# Patient Record
Sex: FEMALE | Race: WHITE
[De-identification: names, ages, dates, MRNs, and addresses within clinical notes are randomized per-mention and may not be internally consistent; named-entity substitution may affect disease eponyms.]

---

## 2019-04-09 ENCOUNTER — HOSPITAL ENCOUNTER (OUTPATIENT)
Dept: HOSPITAL 75 - RAD | Age: 59
End: 2019-04-09
Attending: PEDIATRICS
Payer: COMMERCIAL

## 2019-04-09 DIAGNOSIS — G95.89: ICD-10-CM

## 2019-04-09 DIAGNOSIS — M50.31: Primary | ICD-10-CM

## 2019-04-09 DIAGNOSIS — M46.83: ICD-10-CM

## 2019-04-09 DIAGNOSIS — M50.21: ICD-10-CM

## 2019-04-09 DIAGNOSIS — Z98.1: ICD-10-CM

## 2019-04-09 DIAGNOSIS — M48.03: ICD-10-CM

## 2019-04-09 PROCEDURE — 72141 MRI NECK SPINE W/O DYE: CPT

## 2019-04-09 NOTE — DIAGNOSTIC IMAGING REPORT
CLINICAL INDICATION: Patient with neck pain and bilateral arm

pain/numbness. Patient had cervical spine surgery about four

years ago.



EXAM: MRI of the cervical spine performed without IV contrast.

Sequences include sagittal T2, sagittal T1, sagittal STIR, axial

T2, and axial T1.



COMPARISON: None.



FINDINGS: Limited visualization posterior fossa shows no

significant abnormality. There is an area of focal cord

compression and amorphous localized intramedullary cord signal

seen at the level. Otherwise, the remainder of the cervical cord

is unremarkable.



There are postop changes to the cervical spine with C4 through C7

anterior cervical disc fusion. There is no paraspinal fluid

collection.



C1-C2: There is no significant central canal narrowing.



C2-C3: There is moderate left facet arthropathy and mild right

facet arthropathy. There is ligamentum flavum buckling. There is

mild central canal narrowing. There is moderate-to-severe left

neural foramen narrowing and no significant right neural foramen

narrowing.



C3-C4: There is a diffuse disc bulge with moderate loss of

vertebral disc height. Hypertrophic posterior disc spurs and

bilateral uncinate spurs. There is moderate right facet

arthropathy/hypertrophy and mild left facet arthropathy. There is

severe central narrowing with cervical cord impingement and

deformity. There is severe bilateral neural foramen narrowing.



C4-C5: There is mild bilateral facet arthropathy. There is no

significant central spinal canal or neural foramen narrowing.



C5 C6: There is localized ligament flavum buckling which causes

mild mucosal upon the thecal sac posteriorly. There is

mild-to-moderate bilateral facet arthropathy. There is at least

moderate right neural foramen narrowing and mild left neural

foramen narrowing. There is mild-to-moderate central canal

narrowing.



C6-C7: There is mild bilateral facet arthropathy. There is no

significant central canal narrowing. There is at least mild left

neural foramen narrowing.



C7-T1: There is moderate bilateral facet arthropathy. There is at

least mild bilateral neural foramen narrowing. There is no

significant central canal narrowing.



IMPRESSION:

1: There is C3-C4 adjacent level severe degenerative disc disease

with diffuse disc bulge, hypertrophic disc spurs and facet

arthropathy. There is associated severe central canal narrowing

and severe bilateral neural foramen narrowing. There is cervical

cord deformity with intramedullary high T2 signal which may

related to cord contusion.



2: There is C4 through C7 anterior cervical disc fusion.



3: There are other levels of the cervical spine degenerative disc

disease, as described above.



Dictated by: 



  Dictated on workstation # [id number]